# Patient Record
(demographics unavailable — no encounter records)

---
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
## 2025-03-25 NOTE — ASSESSMENT
[FreeTextEntry1] : Her facial pain is under good control. She will continue oxcarbazepine 300 mg twice daily. The addition of gabapentin is probably helping her facial pain as well as her essential tremors.    continue gabapentin 300 mg qhs  I will take over this prescription when she stops f/u with her surgeon.  Continue Tramadol prn for break-through pain.  Continue only Vitamin D3 1000-2000u daily  one year f/u

## 2025-03-25 NOTE — PHYSICAL EXAM
[FreeTextEntry1] : Physical examination   General: No acute distress, Awake, Alert Neck: supple   Mental status Awake, alert, and oriented to person, time and place, Normal attention span and concentration, Recent and remote memory intact, Language intact, Fund of knowledge intact.   Cranial Nerves II: VFF III, IV, VI: PERRL, EOMI. V: Decrease sensation to LT on R cheek and chin (chronic) VII: Facial strength is normal B/L. VIII: Gross hearing is intact. IX, X: Palate is midline and elevates symmetrically. XI: Trapezius normal strength. XII: Tongue midline without atrophy or fasciculations.   Motor exam Muscle tone - no evidence of rigidity or resistance in all 4 extremities. No atrophy or fasciculations Muscle Strength: arms and legs, proximal and distal flexors and extensors are normal mild R hand tremor with arms extended  Reflexes All present, normal, and symmetrical.  Coordination Finger to nose: Normal. Heel to shin: Normal. Mild tremor with hands outstretched bilaterally   Gait Normal

## 2025-03-25 NOTE — CONSULT LETTER
[Dear  ___] : Dear  [unfilled], [Consult Letter:] : I had the pleasure of evaluating your patient, [unfilled]. [Please see my note below.] : Please see my note below. [Courtesy Letter:] : I had the pleasure of seeing your patient, [unfilled], in my office today. [FreeTextEntry3] : Sincerely,\par  \par  Chuck Zhang M.D.\par